# Patient Record
Sex: FEMALE | Race: WHITE | HISPANIC OR LATINO | URBAN - METROPOLITAN AREA
[De-identification: names, ages, dates, MRNs, and addresses within clinical notes are randomized per-mention and may not be internally consistent; named-entity substitution may affect disease eponyms.]

---

## 2021-08-13 ENCOUNTER — OFFICE VISIT (OUTPATIENT)
Dept: URBAN - METROPOLITAN AREA CLINIC 32 | Facility: CLINIC | Age: 66
End: 2021-08-13
Payer: MEDICAID

## 2021-08-13 DIAGNOSIS — H25.12 AGE-RELATED NUCLEAR CATARACT, LEFT EYE: ICD-10-CM

## 2021-08-13 DIAGNOSIS — H25.11 AGE-RELATED NUCLEAR CATARACT, RIGHT EYE: ICD-10-CM

## 2021-08-13 PROCEDURE — 99204 OFFICE O/P NEW MOD 45 MIN: CPT | Performed by: OPTOMETRIST

## 2021-08-13 PROCEDURE — 92134 CPTRZ OPH DX IMG PST SGM RTA: CPT | Performed by: OPTOMETRIST

## 2021-08-13 ASSESSMENT — INTRAOCULAR PRESSURE
OS: 14
OD: 14

## 2021-08-13 NOTE — IMPRESSION/PLAN
Impression: Type 2 diabetes mellitus w/o complication: N17.8. Plan: Diabetes type II: no background retinopathy, no signs of neovascularization noted. Discussed ocular and systemic benefits of blood sugar control. MAC OCT preformed and reviewed with patient.  Return to clinic with Dr. Courtney Miller in one year for Diabetic eye exam and MAC OCT

## 2021-09-14 ENCOUNTER — OFFICE VISIT (OUTPATIENT)
Dept: URBAN - METROPOLITAN AREA CLINIC 32 | Facility: CLINIC | Age: 66
End: 2021-09-14
Payer: MEDICAID

## 2021-09-14 DIAGNOSIS — H25.811 COMBINED FORMS OF AGE-RELATED CATARACT, RIGHT EYE: ICD-10-CM

## 2021-09-14 DIAGNOSIS — E11.9 TYPE 2 DIABETES MELLITUS W/O COMPLICATION: ICD-10-CM

## 2021-09-14 PROCEDURE — 92004 COMPRE OPH EXAM NEW PT 1/>: CPT | Performed by: OPHTHALMOLOGY

## 2021-09-14 ASSESSMENT — INTRAOCULAR PRESSURE
OS: 15
OD: 14

## 2021-09-14 ASSESSMENT — VISUAL ACUITY
OS: 20/70
OD: 20/50

## 2021-09-14 ASSESSMENT — KERATOMETRY: OS: 48.13

## 2021-09-14 NOTE — IMPRESSION/PLAN
Impression: Type 2 diabetes mellitus w/o complication: P37.3. Plan: Discussed diagnosis in detail with patient. No treatment is required at this time. Discussed risks of progression. Emphasized blood sugar control. Will continue to observe condition and or symptoms. Call if [address] worsens.

## 2021-09-14 NOTE — IMPRESSION/PLAN
Impression: Combined forms of age-related cataract, bilateral: H25.813. Condition: quality of life issue. Symptoms: could improve with surgery. Vision: vision affected. Plan: Cataracts account for the patient's complaints. Discussed all risks, benefits, procedures and recovery. Patient has quality of life issues. Patient understands changing glasses will not improve vision. Patient desires to have surgery, recommend phacoemulsification with intraocular lens. CE w/IOL OS then OD. R/B/A discussed. RL2. Lens: standard   Aim: plano. Dexycu  will be used.

## 2021-09-17 ENCOUNTER — TESTING ONLY (OUTPATIENT)
Dept: URBAN - METROPOLITAN AREA CLINIC 32 | Facility: CLINIC | Age: 66
End: 2021-09-17
Payer: MEDICAID

## 2021-09-17 DIAGNOSIS — H25.813 COMBINED FORMS OF AGE-RELATED CATARACT, BILATERAL: Primary | ICD-10-CM

## 2021-09-17 ASSESSMENT — PACHYMETRY
OS: 2.92
OD: 2.83
OS: 23.25
OD: 21.75

## 2021-09-30 ENCOUNTER — SURGERY (OUTPATIENT)
Dept: URBAN - METROPOLITAN AREA SURGERY 11 | Facility: SURGERY | Age: 66
End: 2021-09-30
Payer: MEDICAID

## 2021-09-30 PROCEDURE — 66984 XCAPSL CTRC RMVL W/O ECP: CPT | Performed by: OPHTHALMOLOGY

## 2021-10-01 ENCOUNTER — POST-OPERATIVE VISIT (OUTPATIENT)
Dept: URBAN - METROPOLITAN AREA CLINIC 23 | Facility: CLINIC | Age: 66
End: 2021-10-01

## 2021-10-01 ASSESSMENT — INTRAOCULAR PRESSURE
OS: 14
OD: 12

## 2021-10-01 NOTE — IMPRESSION/PLAN
Impression: S/P Cataract Extraction by phacoemulsification with IOL placement; ORA OS - 1 Day. Encounter for surgical aftercare following surgery on a sense organ  Z48.810. Post operative instructions reviewed - Plan: Return as scheduled. Sooner if sudden vision change or increasing pain. Discussed pt. may see floater/Dexycu.

## 2021-10-07 ENCOUNTER — POST-OPERATIVE VISIT (OUTPATIENT)
Dept: URBAN - METROPOLITAN AREA CLINIC 23 | Facility: CLINIC | Age: 66
End: 2021-10-07

## 2021-10-07 ASSESSMENT — INTRAOCULAR PRESSURE
OS: 13
OD: 12

## 2021-10-07 NOTE — IMPRESSION/PLAN
Impression: S/P Cataract Extraction by phacoemulsification with IOL placement; ORA OS - 7 Days. Encounter for surgical aftercare following surgery on a sense organ  Z48.810. Post operative instructions reviewed - Condition is improving - Plan: Discussed findings. No foreign body seen in OD. Recommend use of artificial tears and gel at bedtime to reduce symptoms of dry eye. Return if sudden vision change or increasing pain. Discussed that patient may see Zeeshan Rain.

## 2021-11-04 ENCOUNTER — POST-OPERATIVE VISIT (OUTPATIENT)
Dept: URBAN - METROPOLITAN AREA CLINIC 23 | Facility: CLINIC | Age: 66
End: 2021-11-04
Payer: MEDICAID

## 2021-11-04 ASSESSMENT — KERATOMETRY
OS: 47.75
OD: 48.00

## 2021-11-04 ASSESSMENT — INTRAOCULAR PRESSURE
OS: 19
OD: 16

## 2021-11-04 NOTE — IMPRESSION/PLAN
Impression: S/P Cataract Extraction by phacoemulsification with IOL placement; ORA OS - 35 Days. Encounter for surgical aftercare following surgery on a sense organ  Z48.810. Post operative instructions reviewed - Condition is improving - Plan: Discussed findings. DEXYCU in anterior chamber and conjunctivochalasis account for patient's complaints. Recommend f/u visit with Dr. Jared Gooden to further answer patient's questions. --Advised patient to use artificial tears for comfort.

## 2021-11-23 ENCOUNTER — POST-OPERATIVE VISIT (OUTPATIENT)
Dept: URBAN - METROPOLITAN AREA CLINIC 23 | Facility: CLINIC | Age: 66
End: 2021-11-23

## 2021-11-23 DIAGNOSIS — Z48.810 ENCOUNTER FOR SURGICAL AFTERCARE FOLLOWING SURGERY ON A SENSE ORGAN: Primary | ICD-10-CM

## 2021-11-23 PROCEDURE — 99024 POSTOP FOLLOW-UP VISIT: CPT | Performed by: OPHTHALMOLOGY

## 2021-11-23 ASSESSMENT — INTRAOCULAR PRESSURE
OS: 18
OD: 17

## 2021-11-23 NOTE — IMPRESSION/PLAN
Impression: S/P Cataract Extraction by phacoemulsification with IOL placement; ORA OS - 54 Days. Encounter for surgical aftercare following surgery on a sense organ  Z48.810. Post operative instructions reviewed - Condition is improving - Plan: Patient advised to avoid rubbing her eyes. Observe. Call if symptoms worsen. OK to proceed with refraction and updated glasses prescription at patient convenience. Pt will seek second opinion for lids. --Advised patient to use artificial tears for comfort.